# Patient Record
Sex: MALE | Race: WHITE | NOT HISPANIC OR LATINO | Employment: FULL TIME | URBAN - METROPOLITAN AREA
[De-identification: names, ages, dates, MRNs, and addresses within clinical notes are randomized per-mention and may not be internally consistent; named-entity substitution may affect disease eponyms.]

---

## 2018-08-17 ENCOUNTER — TELEPHONE (OUTPATIENT)
Dept: INTERNAL MEDICINE | Facility: CLINIC | Age: 33
End: 2018-08-17

## 2018-08-17 ENCOUNTER — OFFICE VISIT (OUTPATIENT)
Dept: INTERNAL MEDICINE | Facility: CLINIC | Age: 33
End: 2018-08-17
Payer: COMMERCIAL

## 2018-08-17 VITALS
SYSTOLIC BLOOD PRESSURE: 100 MMHG | OXYGEN SATURATION: 97 % | DIASTOLIC BLOOD PRESSURE: 70 MMHG | WEIGHT: 159.19 LBS | HEART RATE: 73 BPM | HEIGHT: 69 IN | BODY MASS INDEX: 23.58 KG/M2

## 2018-08-17 DIAGNOSIS — R10.32 LLQ PAIN: ICD-10-CM

## 2018-08-17 DIAGNOSIS — K29.70 GASTRITIS, PRESENCE OF BLEEDING UNSPECIFIED, UNSPECIFIED CHRONICITY, UNSPECIFIED GASTRITIS TYPE: ICD-10-CM

## 2018-08-17 DIAGNOSIS — R10.32 ABDOMINAL PAIN, LLQ (LEFT LOWER QUADRANT): Primary | ICD-10-CM

## 2018-08-17 PROCEDURE — 99999 PR PBB SHADOW E&M-NEW PATIENT-LVL IV: CPT | Mod: PBBFAC,,, | Performed by: NURSE PRACTITIONER

## 2018-08-17 PROCEDURE — 3008F BODY MASS INDEX DOCD: CPT | Mod: CPTII,S$GLB,, | Performed by: NURSE PRACTITIONER

## 2018-08-17 PROCEDURE — 99203 OFFICE O/P NEW LOW 30 MIN: CPT | Mod: S$GLB,,, | Performed by: NURSE PRACTITIONER

## 2018-08-17 RX ORDER — OMEPRAZOLE 40 MG/1
40 CAPSULE, DELAYED RELEASE ORAL DAILY
Qty: 30 CAPSULE | Refills: 11 | Status: SHIPPED | OUTPATIENT
Start: 2018-08-17 | End: 2018-09-16

## 2018-08-17 NOTE — PROGRESS NOTES
Subjective:       Patient ID: Andrew Kemp is a 32 y.o. male.    Chief Complaint: Abdominal Pain and Bloated    Pt new to me, here for abdominal pain and bloating    Previous PCP: does not have one      Abdominal Pain   This is a new problem. The current episode started in the past 7 days (2 days). The onset quality is gradual. The problem occurs 2 to 4 times per day. The problem has been waxing and waning. The pain is located in the LLQ. The pain is at a severity of 0/10. The patient is experiencing no pain (no pain now, but when it occurs it is a 5/10). The quality of the pain is cramping, sharp and aching. The abdominal pain does not radiate. Associated symptoms include flatus. Pertinent negatives include no anorexia, arthralgias, belching, constipation, diarrhea, dysuria, fever, frequency, headaches, hematochezia, hematuria, melena, myalgias, nausea, vomiting or weight loss. The pain is aggravated by eating and palpation (greasy foods, sweet tea, carbonated drinks). The pain is relieved by belching and passing flatus. He has tried antacids (Tums and Rolaids) for the symptoms. The treatment provided mild relief.     Review of Systems   Constitutional: Negative for activity change, appetite change, fever, unexpected weight change and weight loss.   Respiratory: Negative for apnea, cough, chest tightness and shortness of breath.    Cardiovascular: Negative for chest pain, palpitations and leg swelling.   Gastrointestinal: Positive for abdominal pain and flatus. Negative for abdominal distention, anorexia, blood in stool, constipation, diarrhea, hematochezia, melena, nausea and vomiting.        As documented in HPI     Endocrine: Negative for cold intolerance, heat intolerance, polydipsia, polyphagia and polyuria.   Genitourinary: Negative for difficulty urinating, dysuria, frequency, hematuria and penile pain.   Musculoskeletal: Negative for arthralgias and myalgias.   Skin: Negative for color change, pallor, rash  "and wound.   Allergic/Immunologic: Negative for environmental allergies, food allergies and immunocompromised state.   Neurological: Negative for dizziness, weakness and headaches.   Hematological: Negative for adenopathy. Does not bruise/bleed easily.   Psychiatric/Behavioral: Negative for agitation, behavioral problems, sleep disturbance and suicidal ideas.       Review of patient's allergies indicates:  No Known Allergies    Current Outpatient Medications:  None     There is no problem list on file for this patient.    History reviewed. No pertinent past medical history.     History reviewed. No pertinent surgical history.     History reviewed. No pertinent family history.  Social History     Socioeconomic History    Marital status:      Spouse name: None    Number of children: None    Years of education: None    Highest education level: None   Social Needs    Financial resource strain: None    Food insecurity - worry: None    Food insecurity - inability: None    Transportation needs - medical: None    Transportation needs - non-medical: None   Occupational History    Occupation: lumbar yard   Tobacco Use    Smoking status: Current Every Day Smoker     Packs/day: 0.25     Years: 17.00     Pack years: 4.25     Types: Cigarettes    Smokeless tobacco: Never Used   Substance and Sexual Activity    Alcohol use: Yes     Comment: on occasion    Drug use: No    Sexual activity: Yes     Partners: Female   Other Topics Concern    None   Social History Narrative    None       Objective:       Vitals:    08/17/18 0839   BP: 100/70   Pulse: 73   SpO2: 97%   Weight: 72.2 kg (159 lb 2.8 oz)   Height: 5' 9" (1.753 m)   PainSc: 0-No pain     Body mass index is 23.51 kg/m².    Physical Exam   Constitutional: He is oriented to person, place, and time. He appears well-developed and well-nourished.   Eyes: Lids are normal. Lids are everted and swept, no foreign bodies found.   Neck: Trachea normal and full " passive range of motion without pain. No JVD present. Carotid bruit is not present.   Cardiovascular: Normal rate, regular rhythm, normal heart sounds, intact distal pulses and normal pulses.   Pulmonary/Chest: Effort normal and breath sounds normal.   Abdominal: Soft. Normal appearance and bowel sounds are normal. He exhibits no distension and no mass. There is no hepatosplenomegaly. There is tenderness. There is no rebound, no guarding and no CVA tenderness. No hernia.   LLQ tenderness   Musculoskeletal: Normal range of motion.   Neurological: He is alert and oriented to person, place, and time.   Skin: Skin is warm, dry and intact. Capillary refill takes less than 2 seconds.   Psychiatric: He has a normal mood and affect. His speech is normal and behavior is normal. Judgment and thought content normal. Cognition and memory are normal.   Vitals reviewed.        Assessment:       1. Abdominal pain, LLQ (left lower quadrant)    2. Gastritis, presence of bleeding unspecified, unspecified chronicity, unspecified gastritis type    3. BMI 23.0-23.9, adult    4. LLQ pain        Plan:       Andrew was seen today for abdominal pain and bloated.    Diagnoses and all orders for this visit:    Abdominal pain, LLQ (left lower quadrant)  -     omeprazole (PRILOSEC) 40 MG capsule; Take 1 capsule (40 mg total) by mouth once daily.  -     CT Abdomen Pelvis W Wo Contrast; Future  -     CBC auto differential; Future  -     Comprehensive metabolic panel; Future  -     Urinalysis; Future  -     Amylase; Future  -     Lipase; Future  -     H.Pylori Antibody IgG; Future    Gastritis, presence of bleeding unspecified, unspecified chronicity, unspecified gastritis type  -     omeprazole (PRILOSEC) 40 MG capsule; Take 1 capsule (40 mg total) by mouth once daily.  -     CT Abdomen Pelvis W Wo Contrast; Future  -     H.Pylori Antibody IgG; Future    BMI 23.0-23.9, adult  BMI reviewed    LLQ pain  -     CT Abdomen Pelvis W Wo Contrast;  Future    CT scan of abd and pelvis with and without contrast today    Labs today    Will call with results    Omeprazole 40mg daily for bloating and gas    Clear liquid diet for now, progess to BRAT diet/bland diet as tolerated

## 2018-08-17 NOTE — LETTER
August 17, 2018      [facility]  [address]  [facility] [address]  Phone: [phone]  Fax: [phone]       Patient: Andrew Kemp   YOB: 1985  Date of Visit: 08/17/2018    To Whom It May Concern:    Akanksha Kemp  was at [facility] on 08/17/2018. Please excuse him from work for the past 2 days and today. He may return to work/school on 8/20/2018 with no restrictions. If you have any questions or concerns, or if I can be of further assistance, please do not hesitate to contact me.    Sincerely,    Kenya Leigh DNP

## 2018-08-17 NOTE — PATIENT INSTRUCTIONS
CT scan of abd and pelvis with and without contrast today    Labs today    Will call with results    Omeprazole 40mg daily for bloating and gas    Clear liquid diet for now, progess to BRAT diet/bland diet as tolerated      Gastritis (Adult)    Gastritis is inflammation and irritation of the stomach lining. It can be present for a short time (acute) or be long lasting (chronic). Gastritis is often caused by infection with bacteria called H pylori. More than a third of people in the  have this bacteria in their bodies. In many cases, H pylori causes no problems or symptoms. In some people, though, the infection irritates the stomach lining and causes gastritis. Other causes of stomach irritation include drinking alcohol or taking pain-relieving medicines called NSAIDs (such as aspirin or ibuprofen).   Symptoms of gastritis can include:  · Abdominal pain or bloating  · Loss of appetite  · Nausea or vomiting  · Vomiting blood or having black stools  · Feeling more tired than usual  An inflamed and irritated stomach lining is more likely to develop a sore called an ulcer. To help prevent this, gastritis should be treated.  Home care  If needed, medicines may be prescribed. If you have H pylori infection, treating it will likely relieve your symptoms. Other changes can help reduce stomach irritation and help it heal.  · If you have been prescribed medicines for H pylori infection, take them as directed. Take all of the medicine until it is finished or your healthcare provider tells you to stop, even if you feel better.  · Your healthcare provider may recommend avoiding NSAIDs. If you take daily aspirin for your heart or other medical reasons, do not stop without talking to your healthcare provider first.  · Avoid drinking alcohol.  · Stop smoking. Smoking can irritate the stomach and delay healing. As much as possible, stay away from second hand smoke.  Follow-up care  Follow up with your healthcare provider, or as  advised by our staff. Testing may be needed to check for inflammation or an ulcer.  When to seek medical advice  Call your healthcare provider for any of the following:  · Stomach pain that gets worse or moves to the lower right abdomen (appendix area)  · Chest pain that appears or gets worse, or spreads to the back, neck, shoulder, or arm  · Frequent vomiting (cant keep down liquids)  · Blood in the stool or vomit (red or black in color)  · Feeling weak or dizzy  · Fever of 100.4ºF (38ºC) or higher, or as directed by your healthcare provider  Date Last Reviewed: 6/22/2015  © 8689-9384 [employer]. [address] [place] [address], [place] [address]. All rights reserved. This information is not intended as a substitute for professional medical care. Always follow your healthcare professional's instructions.

## 2018-08-17 NOTE — TELEPHONE ENCOUNTER
Spoke with January, at the moment, the procedure is not urgent. We are waiting on his CBC results to determine the urgency.